# Patient Record
Sex: FEMALE | Race: OTHER | NOT HISPANIC OR LATINO | URBAN - METROPOLITAN AREA
[De-identification: names, ages, dates, MRNs, and addresses within clinical notes are randomized per-mention and may not be internally consistent; named-entity substitution may affect disease eponyms.]

---

## 2018-01-27 ENCOUNTER — EMERGENCY (EMERGENCY)
Facility: HOSPITAL | Age: 23
LOS: 1 days | Discharge: HOME | End: 2018-01-27
Attending: EMERGENCY MEDICINE
Payer: MEDICAID

## 2018-01-27 VITALS
HEART RATE: 85 BPM | OXYGEN SATURATION: 98 % | WEIGHT: 238.1 LBS | TEMPERATURE: 98 F | RESPIRATION RATE: 18 BRPM | DIASTOLIC BLOOD PRESSURE: 77 MMHG | SYSTOLIC BLOOD PRESSURE: 114 MMHG | HEIGHT: 69 IN

## 2018-01-27 PROCEDURE — 99283 EMERGENCY DEPT VISIT LOW MDM: CPT

## 2018-01-27 RX ORDER — ACETAMINOPHEN 500 MG
2 TABLET ORAL
Qty: 60 | Refills: 0 | OUTPATIENT
Start: 2018-01-27 | End: 2018-01-31

## 2018-01-27 RX ORDER — AZITHROMYCIN 500 MG/1
1 TABLET, FILM COATED ORAL
Qty: 6 | Refills: 0 | OUTPATIENT
Start: 2018-01-27 | End: 2018-01-31

## 2018-01-27 NOTE — ED PROVIDER NOTE - MEDICAL DECISION MAKING DETAILS
23 y/o F pt presents with R ear pain and difficulty hearing from the R ear. Plan to prescribe Azithromycin, Tylenol, and will d/c home to f/u with PMD.

## 2018-01-27 NOTE — ED PROVIDER NOTE - OBJECTIVE STATEMENT
21 y/o F pt with no PMHx and no PSHx presents to ED c/o R ear pain and difficulty hearing from the R ear x3 days. Pt additionally reports HA since this morning. Pt denies any other complaints. Pt also denies recent Q-Tip usage. Allergies: Penicillin (hives), Motrin (hives).

## 2018-07-10 NOTE — ED ADULT NURSE NOTE - THROAT
"Initial BP 98/59 (BP Location: Right arm, Patient Position: Chair, Cuff Size: Adult Small)  Pulse 103  Temp 98.5  F (36.9  C) (Tympanic)  Ht 5' 2.5\" (1.588 m)  Wt 93 lb 6.4 oz (42.4 kg)  BMI 16.81 kg/m2 Estimated body mass index is 16.81 kg/(m^2) as calculated from the following:    Height as of this encounter: 5' 2.5\" (1.588 m).    Weight as of this encounter: 93 lb 6.4 oz (42.4 kg). .    Delmy Mcintyre CMA    " asymptomatic
